# Patient Record
Sex: FEMALE | Race: BLACK OR AFRICAN AMERICAN | NOT HISPANIC OR LATINO | URBAN - METROPOLITAN AREA
[De-identification: names, ages, dates, MRNs, and addresses within clinical notes are randomized per-mention and may not be internally consistent; named-entity substitution may affect disease eponyms.]

---

## 2022-12-26 ENCOUNTER — EMERGENCY (EMERGENCY)
Facility: HOSPITAL | Age: 37
LOS: 1 days | Discharge: HOME | End: 2022-12-26
Attending: EMERGENCY MEDICINE | Admitting: EMERGENCY MEDICINE
Payer: COMMERCIAL

## 2022-12-26 VITALS
WEIGHT: 214.95 LBS | DIASTOLIC BLOOD PRESSURE: 96 MMHG | RESPIRATION RATE: 16 BRPM | OXYGEN SATURATION: 99 % | TEMPERATURE: 98 F | SYSTOLIC BLOOD PRESSURE: 152 MMHG | HEART RATE: 74 BPM | HEIGHT: 72 IN

## 2022-12-26 VITALS
OXYGEN SATURATION: 99 % | RESPIRATION RATE: 16 BRPM | DIASTOLIC BLOOD PRESSURE: 90 MMHG | SYSTOLIC BLOOD PRESSURE: 138 MMHG | HEART RATE: 74 BPM

## 2022-12-26 LAB — HCG UR QL: NEGATIVE — SIGNIFICANT CHANGE UP

## 2022-12-26 PROCEDURE — 70450 CT HEAD/BRAIN W/O DYE: CPT | Mod: 26,MA

## 2022-12-26 PROCEDURE — 99285 EMERGENCY DEPT VISIT HI MDM: CPT

## 2022-12-26 PROCEDURE — 70450 CT HEAD/BRAIN W/O DYE: CPT | Mod: MA

## 2022-12-26 PROCEDURE — 71046 X-RAY EXAM CHEST 2 VIEWS: CPT | Mod: 26

## 2022-12-26 PROCEDURE — 72125 CT NECK SPINE W/O DYE: CPT | Mod: MA

## 2022-12-26 PROCEDURE — 81025 URINE PREGNANCY TEST: CPT

## 2022-12-26 PROCEDURE — 99284 EMERGENCY DEPT VISIT MOD MDM: CPT | Mod: 25

## 2022-12-26 PROCEDURE — 72125 CT NECK SPINE W/O DYE: CPT | Mod: 26,MA

## 2022-12-26 PROCEDURE — 71046 X-RAY EXAM CHEST 2 VIEWS: CPT

## 2022-12-26 RX ORDER — KETOROLAC TROMETHAMINE 30 MG/ML
60 SYRINGE (ML) INJECTION ONCE
Refills: 0 | Status: DISCONTINUED | OUTPATIENT
Start: 2022-12-26 | End: 2022-12-26

## 2022-12-26 RX ORDER — CYCLOBENZAPRINE HYDROCHLORIDE 10 MG/1
1 TABLET, FILM COATED ORAL
Qty: 12 | Refills: 0
Start: 2022-12-26 | End: 2022-12-29

## 2022-12-26 RX ORDER — CYCLOBENZAPRINE HYDROCHLORIDE 10 MG/1
10 TABLET, FILM COATED ORAL ONCE
Refills: 0 | Status: COMPLETED | OUTPATIENT
Start: 2022-12-26 | End: 2022-12-26

## 2022-12-26 NOTE — ED PROVIDER NOTE - CARE PROVIDER_API CALL
Zuhair Moran)  Orthopedics  [address], [address]  [place] [address]  Phone: [phone]  Fax: [phone]  Follow Up Time: 1-3 Days    Chelsi Alvarez)  Internal Medicine  [address] [place] [address]  Phone: [phone]  Fax: [phone]  Follow Up Time: 1-3 Days

## 2022-12-26 NOTE — ED PROVIDER NOTE - PROVIDER TOKENS
PROVIDER:[TOKEN:[[id number]],FOLLOWUP:[1-3 Days]],PROVIDER:[TOKEN:[[id number]],FOLLOWUP:[1-3 Days]]

## 2022-12-26 NOTE — ED PROVIDER NOTE - PATIENT PORTAL LINK FT
You can access the FollowMyHealth Patient Portal offered by [facility] by registering at the following website: [email or web address]. By joining [facility]’s FollowMyHealth portal, you will also be able to view your health information using other applications (apps) compatible with our system.

## 2022-12-26 NOTE — ED PROVIDER NOTE - DIFFERENTIAL DIAGNOSIS
differential including but not limited to bleeding fracture sprain strain pneumothorax Differential Diagnosis
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
## 2022-12-26 NOTE — ED ADULT NURSE NOTE - NSIMPLEMENTINTERV_GEN_ALL_ED
Implemented All Universal Safety Interventions:  [place] to call system. Call bell, personal items and telephone within reach. Instruct patient to call for assistance. Room bathroom lighting operational. Non-slip footwear when patient is off stretcher. Physically safe environment: no spills, clutter or unnecessary equipment. Stretcher in lowest position, wheels locked, appropriate side rails in place.

## 2022-12-26 NOTE — ED ADULT NURSE NOTE - SKIN TEMPERATURE
Impression: Myopia, bilateral: H52.13. Plan: Discussed diagnosis in detail with patient. Discussed treatment options with patient. New glasses Rx was given today. warm

## 2022-12-26 NOTE — ED PROVIDER NOTE - CARE PROVIDERS DIRECT ADDRESSES
,DirectAddress_Unknown,[email or web address]

## 2022-12-26 NOTE — ED PROVIDER NOTE - WR ORDER DATE AND TIME 1
Assessment/Plan:    No problem-specific Assessment & Plan notes found for this encounter  Diagnoses and all orders for this visit:    Well adult exam  -     CBC and differential; Future  -     Comprehensive metabolic panel; Future  -     Lipid panel; Future  -     TSH, 3rd generation with Free T4 reflex; Future    SHE IS HERE TODAY FOR THE REGULAR WELLNESS VISIT she is doing very well the only complain are the knee pain which she had before and the lower back pain ice she does not have any symptoms or complaints she recently lost her job so she is mostly at home she is doing some exercises and yoga, patient the was seen a almost 6 months ago and she was doing good at that time    Since the last visit no hospitalizations no new problems no new medications    Patient does not smoke or drink alcohol she is living with her  right she has a dog but no children at home    Family history is unremarkable for atherosclerotic cardiovascular premature diseases or cancer    Patient is up to date on her preventive workup including Pap smear mammogram and rectal examination the patient did not get a colonoscopy she is 44 and she should be getting the colonoscopy and the DEXA scan I will recommend both of them        Subjective:   No new complaint   Patient ID: Barbara Vela is a 44 y o  female  HPI    The following portions of the patient's history were reviewed and updated as appropriate: allergies, current medications, past family history, past medical history, past social history, past surgical history and problem list     Review of Systems   Constitutional: Negative for activity change, appetite change, chills, diaphoresis and fever  HENT: Positive for congestion and postnasal drip  Negative for ear discharge, ear pain, rhinorrhea, sinus pain, sinus pressure and sore throat  Eyes: Negative for pain, discharge, itching and visual disturbance     Respiratory: Negative for cough, chest tightness, shortness of breath and wheezing  Cardiovascular: Negative for chest pain, palpitations and leg swelling  Gastrointestinal: Negative for abdominal pain, constipation, diarrhea, nausea and vomiting  Endocrine: Negative for polydipsia, polyphagia and polyuria  Genitourinary: Negative for difficulty urinating, dysuria, pelvic pain and urgency  Musculoskeletal: Positive for back pain  Negative for arthralgias, neck pain and neck stiffness  Left knee pain is given a   Skin: Negative for rash and wound  Neurological: Negative for dizziness, weakness, numbness and headaches           Past Medical History:   Diagnosis Date    GERD (gastroesophageal reflux disease)          Current Outpatient Prescriptions:     Ascorbic Acid (VITAMIN C) 500 MG CAPS, Take by mouth, Disp: , Rfl:     Calcium Carb-Cholecalciferol (CALTRATE 600+D) 600-800 MG-UNIT TABS, Take 1 tablet by mouth daily, Disp: , Rfl:     cyanocobalamin (VITAMIN B-12) 500 mcg tablet, Take 1 tablet by mouth daily, Disp: , Rfl:     fluticasone (FLONASE) 50 mcg/act nasal spray, 2 sprays into each nostril daily, Disp: , Rfl:     meclizine (ANTIVERT) 25 mg tablet, Take 1 tablet (25 mg total) by mouth 3 (three) times a day as needed for dizziness, Disp: 30 tablet, Rfl: 0    Multiple Vitamin (MULTI-VITAMIN) tablet, Take 1 tablet by mouth daily, Disp: , Rfl:     Allergies   Allergen Reactions    Lactose Other (See Comments)     intolerance    Other      SEASONAL ALLERGIES       Social History   Past Surgical History:   Procedure Laterality Date    BREAST BIOPSY Left 2006    core    NO PAST SURGERIES       Family History   Problem Relation Age of Onset    Arthritis Mother     Skin cancer Mother     Skin cancer Father     Arthritis Maternal Grandmother     Hiatal hernia Maternal Grandfather     Arthritis Family     Hiatal hernia Family     Skin cancer Family     Varicose Veins Family         Of lower extremities       Objective:  /82 (BP Location: Left arm, Patient Position: Sitting, Cuff Size: Adult)   Pulse 83   Temp 99 °F (37 2 °C) (Tympanic)   Ht 5' 8 98" (1 752 m)   Wt 81 7 kg (180 lb 3 2 oz)   SpO2 100%   BMI 26 63 kg/m²        Physical Exam   Constitutional: She is oriented to person, place, and time  She appears well-developed and well-nourished  No distress  HENT:   Head: Normocephalic and atraumatic  Right Ear: External ear normal    Left Ear: External ear normal    Nose: Nose normal    Mouth/Throat: Oropharynx is clear and moist  No oropharyngeal exudate  Eyes: Pupils are equal, round, and reactive to light  Conjunctivae and EOM are normal  Right eye exhibits no discharge  Left eye exhibits no discharge  Neck: Normal range of motion  Neck supple  No thyromegaly present  Cardiovascular: Normal rate, regular rhythm, normal heart sounds and intact distal pulses  Exam reveals no gallop and no friction rub  No murmur heard  Pulmonary/Chest: Effort normal and breath sounds normal  No stridor  No respiratory distress  She has no wheezes  She has no rales  Abdominal: Soft  Bowel sounds are normal  She exhibits no distension  There is no tenderness  Musculoskeletal:        Lumbar back: She exhibits tenderness, pain and spasm  She exhibits normal range of motion, no bony tenderness, no swelling, no edema and normal pulse  Back:    Lymphadenopathy:     She has no cervical adenopathy  Neurological: She is alert and oriented to person, place, and time  Skin: Skin is warm and dry  No rash noted  She is not diaphoretic  No erythema  Psychiatric: She has a normal mood and affect  Her behavior is normal  Judgment and thought content normal    Nursing note and vitals reviewed  No results found for this or any previous visit (from the past 672 hour(s))  26-Dec-2022 16:35
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
## 2022-12-26 NOTE — ED PROVIDER NOTE - GASTROINTESTINAL, MLM
"Lai Del Rosario  62 y.o. male    03/20/2018  1. Essential hypertension    2. Mixed hyperlipidemia        History of Present Illness    Mr. Del Rosario is here for follow-up of his above stated problems.  He denied any chest pain, shortness of breath, palpitation, dizziness or syncope.  His blood pressure was in the normal range.  He has been physically quite active without any restrictions.      SUBJECTIVE    Allergies   Allergen Reactions   • Meperidine Rash         Past Medical History:   Diagnosis Date   • Hyperlipidemia    • Hypertension          Past Surgical History:   Procedure Laterality Date   • MOLE REMOVAL           Family History   Problem Relation Age of Onset   • Heart disease Father    • Heart attack Father          Social History     Social History   • Marital status:      Spouse name: N/A   • Number of children: N/A   • Years of education: N/A     Occupational History   • Not on file.     Social History Main Topics   • Smoking status: Never Smoker   • Smokeless tobacco: Current User     Types: Chew   • Alcohol use 1.2 oz/week     2 Cans of beer per week      Comment: social   • Drug use: No   • Sexual activity: Defer     Other Topics Concern   • Not on file     Social History Narrative   • No narrative on file         Current Outpatient Prescriptions   Medication Sig Dispense Refill   • atorvastatin (LIPITOR) 10 MG tablet Take 10 mg by mouth 3 (Three) Times a Week.     • irbesartan (AVAPRO) 150 MG tablet TAKE 1 TABLET DAILY 90 tablet 2   • metoprolol succinate XL (TOPROL-XL) 50 MG 24 hr tablet Take 1 tablet by mouth Daily. 90 tablet 3     No current facility-administered medications for this visit.          OBJECTIVE    /70 (BP Location: Left arm, Patient Position: Sitting, Cuff Size: Adult)   Pulse 68   Ht 182.9 cm (72\")   Wt 80.8 kg (178 lb 1 oz)   SpO2 98%   BMI 24.15 kg/m²         Review of Systems     Constitutional:  Denies recent weight loss, weight gain, fever or chills, " no change in exercise tolerance     HENT:  Denies any hearing loss, epistaxis, hoarseness, or difficulty speaking.     Eyes: Wears eyeglasses or contact lenses     Respiratory:  Denies dyspnea with exertion,no cough, wheezing, or hemoptysis.     Cardiovascular: Negative for palpations, chest pain, orthopnea, PND, peripheral edema, syncope, or claudication.     Gastrointestinal:  Denies change in bowel habits, dyspepsia, ulcer disease, hematochezia, or melena.     Endocrine: Negative for cold intolerance, heat intolerance, polydipsia, polyphagia and polyuria. Denies any history of weight change, heat/cold intolerance, polydipsia, polyuria     Genitourinary: Negative.      Musculoskeletal: Denies any history of arthritic symptoms or back problems     Skin:  Denies any change in hair or nails, rashes, or skin lesions.     Allergic/Immunologic: Negative.  Negative for environmental allergies, food allergies and immunocompromised state.     Neurological:  Denies any history of recurrent headaches, strokes, TIA, or seizure disorder.     Hematological: Denies any food allergies, seasonal allergies, bleeding disorders, or lymphadenopathy.     Psychiatric/Behavioral: Denies any history of depression, substance abuse, or change in cognitive function.         Physical Exam     Constitutional: Cooperative, alert and oriented, well-developed, well-nourished, in no acute distress.     HENT:   Head: Normocephalic, normal hair patterns, no masses or tenderness.  Ears, Nose, and Throat: No gross abnormalities. No pallor or cyanosis. Dentition good.   Eyes: EOMS intact, PERRL, conjunctivae and lids unremarkable. Fundoscopic exam and visual fields not performed.   Neck: No palpable masses or adenopathy, no thyromegaly, no JVD, carotid pulses are full and equal bilaterally and without  Bruits.     Cardiovascular: Regular rhythm, S1 and S2 normal, no S3 or S4. Apical impulse not displaced. No murmurs, gallops, or rubs detected.      Pulmonary/Chest: Chest: normal symmetry, no tenderness to palpation, normal respiratory excursion, no intercostal retraction, no use of accessory muscles.            Pulmonary: Normal breath sounds. No rales or ronchi.    Abdominal: Abdomen soft, bowel sounds normoactive, no masses, no hepatosplenomegaly, non-tender, no bruits.     Musculoskeletal: No deformities, clubbing, cyanosis, erythema, or edema observed. There are no spinal abnormalities noted. Normal muscle strength and tone. Pulses full and equal in all extremities, no bruits auscultated.     Neurological: No gross motor or sensory deficits noted, affect appropriate, oriented to time, person, place.     Skin: Warm and dry to the touch, no apparent skin lesions or masses noted.     Psychiatric: He has a normal mood and affect. His behavior is normal. Judgment and thought content normal.         Procedures      Lab Results   Component Value Date    WBC 6.42 09/19/2017    HGB 14.4 09/19/2017    HCT 44.1 09/19/2017    MCV 91.5 09/19/2017     09/19/2017     Lab Results   Component Value Date    GLUCOSE 85 09/19/2017    BUN 10 09/19/2017    CREATININE 0.83 09/19/2017    EGFRIFNONA 94 09/19/2017    BCR 12.0 09/19/2017    CO2 28.0 09/19/2017    CALCIUM 9.5 09/19/2017    ALBUMIN 4.30 09/19/2017    LABIL2 1.7 09/19/2017    AST 25 09/19/2017    ALT 36 09/19/2017     Lab Results   Component Value Date    CHOL 175 09/19/2017     Lab Results   Component Value Date    TRIG 67 09/19/2017     Lab Results   Component Value Date    HDL 69 09/19/2017     No components found for: LDLCALC  Lab Results   Component Value Date     09/19/2017     No results found for: HDLLDLRATIO  No components found for: CHOLHDL  No results found for: HGBA1C  No results found for: TSH, B1UVKLM, V0QYUOU, THYROIDAB        ASSESSMENT AND PLAN  Mr. Del Rosario is stable with no evidence of angina.  His blood pressures in the normal range.  Lipid-lowering therapy with atorvastatin and  antihypertensive therapy with Avapro and metoprolol succinate has been continued.  His LDL was 101 when checked in September 2017.    Lai was seen today for hypertension and follow-up.    Diagnoses and all orders for this visit:    Essential hypertension    Mixed hyperlipidemia        Rhett Jimenez MD  3/20/2018  10:13 AM   Abdomen soft, non-tender, no guarding. no cvat
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
## 2022-12-27 PROBLEM — Z00.00 ENCOUNTER FOR PREVENTIVE HEALTH EXAMINATION: Status: ACTIVE | Noted: 2022-12-27

## 2023-06-15 NOTE — ED ADULT NURSE NOTE - NS ED PATIENT SAFETY CONCERN
Pulmonary Progress Note    Date of Admission: 6/14/2023   LOS: 1 day       CC:  Chief Complaint   Patient presents with    Shortness of Breath        Subjective:  Use BiPAP all night. Now off. ROS:   No nausea  No Vomiting  No chest pain       Assessment:     Acute hypercarbic and hypoxic respiratory failure in the setting of methadone use   Chronic hypercapnia   Chronic hypoxemia 2-3  History of diastolic CHF. Plan: This note may have been transcribed using [id number] [employer]. Please disregard any translational errors. Hospital Day: 1     Acute hypercapnia with chronic hypercapnia  Acute hypercapnia resolved. At baseline chronic hypercapnia. Transfer out of ICU. Talk to family yesterday about chronic hypercapnia secondary to chronic narcotics. They were aware of this from [facility]. Family believes that she was dismissed from the methadone clinic during her last visit. We will ask pharmacy to call today to verify. If she is dismissed, start a wean of methadone here. Acute hypoxemic respiratory failure on chronic hypoxemia  Currently 6 L. Attempt to wean back to baseline. Hyperkalemia  Monitor. Thea Joseph. Lasix        COPD  Steroids, discontinue  DuoNebs        Wounds    Improved with diuresis. No longer leaking clear fluid. Diastolic CHF  1 Lasix    Chronic pain  Patient's methadone has been weaned down. She states that she takes 25 of methadone. I am confident this is not true. Call to verify regiment. In addition, family believes that she was dismissed from the methadone clinic. This will be verified as well. Data:        PHYSICAL EXAM:   Blood pressure (!) 164/77, pulse 79, temperature 97.2 °F (36.2 °C), temperature source Axillary, resp. rate 14, weight 207 lb 7.3 oz (94.1 kg), SpO2 99 %.'  Body mass index is 36.75 kg/m². Gen: No distress. ENT:   Resp: No accessory muscle use. No crackles. No wheezes. No rhonchi.     CV: No